# Patient Record
Sex: FEMALE | Race: WHITE | NOT HISPANIC OR LATINO | Employment: OTHER | URBAN - METROPOLITAN AREA
[De-identification: names, ages, dates, MRNs, and addresses within clinical notes are randomized per-mention and may not be internally consistent; named-entity substitution may affect disease eponyms.]

---

## 2018-11-28 ENCOUNTER — HOSPITAL ENCOUNTER (EMERGENCY)
Facility: MEDICAL CENTER | Age: 28
End: 2018-11-28
Attending: EMERGENCY MEDICINE
Payer: MEDICAID

## 2018-11-28 VITALS
DIASTOLIC BLOOD PRESSURE: 93 MMHG | SYSTOLIC BLOOD PRESSURE: 134 MMHG | HEIGHT: 65 IN | OXYGEN SATURATION: 95 % | HEART RATE: 80 BPM | WEIGHT: 162.7 LBS | TEMPERATURE: 98.6 F | BODY MASS INDEX: 27.11 KG/M2 | RESPIRATION RATE: 21 BRPM

## 2018-11-28 DIAGNOSIS — T78.40XA ALLERGIC REACTION, INITIAL ENCOUNTER: ICD-10-CM

## 2018-11-28 PROCEDURE — 99284 EMERGENCY DEPT VISIT MOD MDM: CPT

## 2018-11-28 PROCEDURE — 700101 HCHG RX REV CODE 250: Performed by: EMERGENCY MEDICINE

## 2018-11-28 PROCEDURE — 700111 HCHG RX REV CODE 636 W/ 250 OVERRIDE (IP): Performed by: EMERGENCY MEDICINE

## 2018-11-28 PROCEDURE — 96374 THER/PROPH/DIAG INJ IV PUSH: CPT

## 2018-11-28 PROCEDURE — 96375 TX/PRO/DX INJ NEW DRUG ADDON: CPT

## 2018-11-28 PROCEDURE — 94640 AIRWAY INHALATION TREATMENT: CPT

## 2018-11-28 RX ORDER — EPINEPHRINE 0.3 MG/.3ML
0.3 INJECTION SUBCUTANEOUS ONCE
Qty: 0.3 ML | Refills: 0 | Status: SHIPPED | OUTPATIENT
Start: 2018-11-28 | End: 2018-11-28

## 2018-11-28 RX ORDER — METHYLPREDNISOLONE SODIUM SUCCINATE 125 MG/2ML
125 INJECTION, POWDER, LYOPHILIZED, FOR SOLUTION INTRAMUSCULAR; INTRAVENOUS ONCE
Status: COMPLETED | OUTPATIENT
Start: 2018-11-28 | End: 2018-11-28

## 2018-11-28 RX ORDER — PREDNISONE 20 MG/1
60 TABLET ORAL DAILY
Qty: 9 TAB | Refills: 0 | Status: SHIPPED | OUTPATIENT
Start: 2018-11-28 | End: 2018-12-01

## 2018-11-28 RX ADMIN — METHYLPREDNISOLONE SODIUM SUCCINATE 125 MG: 125 INJECTION, POWDER, FOR SOLUTION INTRAMUSCULAR; INTRAVENOUS at 16:14

## 2018-11-28 RX ADMIN — FAMOTIDINE 20 MG: 10 INJECTION INTRAVENOUS at 16:14

## 2018-11-28 RX ADMIN — ALBUTEROL SULFATE 2.5 MG: 2.5 SOLUTION RESPIRATORY (INHALATION) at 16:18

## 2018-11-28 ASSESSMENT — LIFESTYLE VARIABLES: DO YOU DRINK ALCOHOL: NO

## 2018-11-28 NOTE — ED TRIAGE NOTES
Pt ambs to triage  Chief Complaint   Patient presents with   • Allergic Reaction     Itchy Full body, ate a salad 10 min ago. Unaware of food allergies.  took 2 benadryl (unsure what dose)   • Nasal Congestion   • Facial Swelling   • Wheezing     Pt is an asthmatic   • Tongue Swelling     pt feels her tongue is getting fat, handling secretions, speaking in full sentences   Charge RN aware of pt

## 2018-11-29 NOTE — FLOWSHEET NOTE
11/28/18 1621   Events/Summary/Plan   Events/Summary/Plan SVN given, patient stable   Interdisciplinary Plan of Care-Goals (Indications)   Bronchodilator Indications Physical Exam / Hyperinflation / Wheezing (bronchospasm)   Interdisciplinary Plan of Care-Outcomes    Bronchodilator Outcome Improved Vital Signs and Measures of Gas Exchange   SVN Group   #SVN Performed Yes   Given By: Mouthpiece   Date SVN Last Changed 11/28/18   Date SVN Next Change Due (Q 7 Days) 12/05/18   Respiratory WDL   Respiratory (WDL) X   Chest Exam   Work Of Breathing / Effort Mild   Respiration 19   Pulse 76   Heart Rate (Monitored) 76   Breath Sounds   Pre/Post Intervention Pre Intervention Assessment   RUL Breath Sounds Clear   RML Breath Sounds Clear   RLL Breath Sounds Clear   DAVID Breath Sounds Clear   LLL Breath Sounds Clear   Oximetry   Continuous Oximetry Yes   Oxygen   Home O2 Use Prior To Admission? No   Pulse Oximetry 95 %   O2 (LPM) 0   FiO2% 21 %   O2 Daily Delivery Respiratory  Room Air with O2 Available

## 2018-11-29 NOTE — ED NOTES
Pt given discharge instructions/prescriptions given/ home care instructions explained, pt verbalized understanding of instructions given, pt ambulatory to [facility].

## 2018-11-29 NOTE — ED PROVIDER NOTES
ED Provider Note    CHIEF COMPLAINT  Chief Complaint   Patient presents with   • Allergic Reaction     Itchy Full body, ate a salad 10 min ago. Unaware of food allergies.  took 2 benadryl (unsure what dose)   • Nasal Congestion   • Facial Swelling   • Wheezing     Pt is an asthmatic   • Tongue Swelling     pt feels her tongue is getting fat, handling secretions, speaking in full sentences       HPI  Ml MILLS is a 28 y.o. female who presents for evaluation of allergic reaction, a few minutes prior to arrival she was eating a salad and had an acute onset of generalized itching, tongue and lip swelling as well as puffiness around her eyes he did feel short of breath, she took some Benadryl, 50 mg total, and came right to the emergency department.  She states one time as a child she had an allergic reaction that she presumed was to strawberries, she had no other food allergies that she knows of, no allergic reaction since then.  Since taking the Benadryl she reports slight improvement.  No nausea, vomiting or diarrhea, no abdominal pain.  No other specific complaints offered by the patient at this time    REVIEW OF SYSTEMS  Negative for fever, abdominal pain, nausea, vomiting, diarrhea, headache, focal weakness, focal numbness, focal tingling, back pain. All other systems are negative.     PAST MEDICAL HISTORY  Past Medical History:   Diagnosis Date   • Insomnia        FAMILY HISTORY  No family history on file.    SOCIAL HISTORY  Social History   Substance Use Topics   • Smoking status: Current Every Day Smoker     Packs/day: 0.50     Types: Cigarettes   • Smokeless tobacco: Not on file   • Alcohol use Yes      Comment: occasional       SURGICAL HISTORY  Past Surgical History:   Procedure Laterality Date   • GYN SURGERY       x 2   • OTHER      deviated septum repair       CURRENT MEDICATIONS  I personally reviewed the medication list in the charting documentation.     ALLERGIES  No Known  "Allergies    MEDICAL RECORD  I have reviewed patient's medical record and pertinent results are listed above.      PHYSICAL EXAM  VITAL SIGNS: /93   Pulse 100   Temp 37 °C (98.6 °F) (Temporal)   Resp 18   Ht 1.651 m (5' 5\")   Wt 73.8 kg (162 lb 11.2 oz)   SpO2 95%   BMI 27.07 kg/m²    Constitutional: Well appearing patient in no acute distress.  Not toxic, nor ill in appearance.  Smiling and laughing  HENT: Mucus membranes edema noted of the lips diffusely with mild edema of the tongue.  Eyes: No scleral icterus.  Injected conjunctiva.  Bilaterally edematous periorbital regions.  Neck: Supple, comfortable, nonpainful range of motion.  No stridor.  Cardiovascular: Regular heart rate and rhythm.   Thorax & Lungs: Scattered expiratory wheezes, no rales, good air movement bilaterally.  Abdomen: Soft, with no tenderness, rebound nor guarding.  No mass or pulsatile mass appreciated.  Skin: Warm, dry. No rash appreciated  Extremities/Musculoskeletal: No sign of trauma. No asymmetric calf tenderness, erythema or edema. Normal range of motion   Neurologic: Alert & oriented. No focal deficits observed.   Psychiatric: Normal affect appropriate for the clinical situation.    COURSE & MEDICAL DECISION MAKING  I have reviewed any medical record information, laboratory studies and radiographic results as noted above    Encounter Summary: This is a 28 y.o. female with presentation consistent with allergic reaction to an unknown food product from a salad, took Benadryl and is improving slowly prior to arrival, she has evidence of tongue and lip swelling as well as some wheezing and puffiness of her eyes and nasal congestion, she Zheng took Benadryl so will administer Solu-Medrol and Pepcid with IV fluids, I will not administer epinephrine at this time but she will be watched quite closely here in the emergency department ------ patient observed for over 2 hours, she reports essentially resolution of all of her symptoms " except for the puffiness around her eyes, she looks well, will be prescribed a 3-day course of prednisone to begin tomorrow and an EpiPen, I have instructed her to use Benadryl around every 6 hours for the next day or 2, strict return instructions have been discussed      DISPOSITION: Discharge home in stable condition      FINAL IMPRESSION  1. Allergic reaction, initial encounter           This dictation was created using voice recognition software. The accuracy of the dictation is limited to the abilities of the software. I expect there may be some errors of grammar and possibly content. The nursing notes were reviewed and certain aspects of this information were incorporated into this note.    Electronically signed by: Tobias Carpenter, 11/28/2018 4:07 PM

## 2020-01-03 ENCOUNTER — HOSPITAL ENCOUNTER (EMERGENCY)
Facility: MEDICAL CENTER | Age: 30
End: 2020-01-03
Attending: EMERGENCY MEDICINE
Payer: MEDICAID

## 2020-01-03 VITALS
OXYGEN SATURATION: 96 % | WEIGHT: 147.71 LBS | HEIGHT: 65 IN | TEMPERATURE: 99.7 F | HEART RATE: 79 BPM | SYSTOLIC BLOOD PRESSURE: 106 MMHG | RESPIRATION RATE: 19 BRPM | BODY MASS INDEX: 24.61 KG/M2 | DIASTOLIC BLOOD PRESSURE: 55 MMHG

## 2020-01-03 DIAGNOSIS — T78.40XA ALLERGIC REACTION, INITIAL ENCOUNTER: ICD-10-CM

## 2020-01-03 PROCEDURE — 96374 THER/PROPH/DIAG INJ IV PUSH: CPT

## 2020-01-03 PROCEDURE — 700111 HCHG RX REV CODE 636 W/ 250 OVERRIDE (IP): Performed by: EMERGENCY MEDICINE

## 2020-01-03 PROCEDURE — 99284 EMERGENCY DEPT VISIT MOD MDM: CPT

## 2020-01-03 PROCEDURE — 96372 THER/PROPH/DIAG INJ SC/IM: CPT

## 2020-01-03 PROCEDURE — 96375 TX/PRO/DX INJ NEW DRUG ADDON: CPT

## 2020-01-03 RX ORDER — METHYLPREDNISOLONE SODIUM SUCCINATE 125 MG/2ML
125 INJECTION, POWDER, LYOPHILIZED, FOR SOLUTION INTRAMUSCULAR; INTRAVENOUS ONCE
Status: COMPLETED | OUTPATIENT
Start: 2020-01-03 | End: 2020-01-03

## 2020-01-03 RX ORDER — PREDNISONE 20 MG/1
60 TABLET ORAL DAILY
Qty: 12 TAB | Refills: 0 | Status: SHIPPED | OUTPATIENT
Start: 2020-01-03 | End: 2020-01-07

## 2020-01-03 RX ORDER — EPINEPHRINE 1 MG/ML(1)
0.5 AMPUL (ML) INJECTION ONCE
Status: COMPLETED | OUTPATIENT
Start: 2020-01-03 | End: 2020-01-03

## 2020-01-03 RX ORDER — EPINEPHRINE 0.3 MG/.3ML
0.3 INJECTION SUBCUTANEOUS ONCE
Qty: 0.3 ML | Refills: 0 | Status: SHIPPED | OUTPATIENT
Start: 2020-01-03 | End: 2020-01-03

## 2020-01-03 RX ORDER — DIPHENHYDRAMINE HYDROCHLORIDE 50 MG/ML
25 INJECTION INTRAMUSCULAR; INTRAVENOUS ONCE
Status: COMPLETED | OUTPATIENT
Start: 2020-01-03 | End: 2020-01-03

## 2020-01-03 RX ADMIN — METHYLPREDNISOLONE SODIUM SUCCINATE 125 MG: 125 INJECTION, POWDER, FOR SOLUTION INTRAMUSCULAR; INTRAVENOUS at 17:35

## 2020-01-03 RX ADMIN — EPINEPHRINE 0.5 MG: 1 INJECTION INTRAMUSCULAR; INTRAVENOUS; SUBCUTANEOUS at 17:35

## 2020-01-03 RX ADMIN — DIPHENHYDRAMINE HYDROCHLORIDE 25 MG: 50 INJECTION INTRAMUSCULAR; INTRAVENOUS at 17:35

## 2020-01-03 RX ADMIN — FAMOTIDINE 20 MG: 10 INJECTION INTRAVENOUS at 17:36

## 2020-01-03 ASSESSMENT — LIFESTYLE VARIABLES: DO YOU DRINK ALCOHOL: NO

## 2020-01-04 NOTE — ED PROVIDER NOTES
ED Provider Note      Primary care provider: Pcp Pt States None  Means of arrival: Walk-in  History obtained from: Patient      CHIEF COMPLAINT  Chief Complaint   Patient presents with   • Allergic Reaction       HPI  Ml MILLS is a 29 y.o. female who presents to the Emergency Department complaining of an acute allergic reaction.  She states that all symptoms started about an hour ago with itching on her palms which spread to generalized body itching.  She also describes some shortness of breath, tongue swelling, nasal congestion and wheezing.  She is uncertain what may have caused this reaction.  She took 2 half tablets of Benadryl before coming to the ED.  Reports that a similar episode happened about a year ago and both now and then her only possible exposure was a salad.  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  She is not on any new medications.  No exposure to new detergents or soaps.    REVIEW OF SYSTEMS  Pertinent positives include heart racing, shortness of breath, wheezing, itching. Pertinent negatives include no chest pain, nausea, vomiting.  All other systems reviewed and negative.    PAST MEDICAL HISTORY   has a past medical history of Insomnia.    SURGICAL HISTORY   has a past surgical history that includes gyn surgery and other.    SOCIAL HISTORY  Social History     Tobacco Use   • Smoking status: Former Smoker     Packs/day: 0.00     Types: Cigarettes   Substance Use Topics   • Alcohol use: Yes     Comment: occasional   • Drug use: No      Social History     Substance and Sexual Activity   Drug Use No       FAMILY HISTORY  History reviewed. No pertinent family history.    CURRENT MEDICATIONS  Home Medications     Reviewed by Yamile Reyes R.N. (Registered Nurse) on 01/03/20 at 1712  Med List Status: Complete   Medication Last Dose Status        Patient Nino Taking any Medications                       ALLERGIES  No Known  "Allergies    PHYSICAL EXAM  VITAL SIGNS: /83   Pulse (!) 122   Temp 37.6 °C (99.7 °F) (Temporal)   Resp 18   Ht 1.651 m (5' 5\")   Wt 67 kg (147 lb 11.3 oz)   SpO2 92%   BMI 24.58 kg/m²     Constitutional: Alert in no apparent distress.  HENT: No signs of trauma, Bilateral external ears normal, Nose normal.  No obvious swelling or edema in the palate or tongue  Eyes: Pupils are equal and reactive, Conjunctiva injected, Non-icteric.   Neck: Normal range of motion, No tenderness, Supple, Mild stridor.   Cardiovascular: Tachycardic, regular rhythm, no murmurs.   Thorax & Lungs: Normal breath sounds, No respiratory distress, No wheezing, No chest tenderness.   Abdomen: Bowel sounds normal, Soft, No tenderness, No masses, No peritoneal signs.  Skin: Warm, Dry, disputes flushing, No discrete rash.   Musculoskeletal:  No major deformities noted.   Neurologic: Alert, moving all extremities without difficulty, no focal deficits.          COURSE & MEDICAL DECISION MAKING  Pertinent Labs & Imaging studies reviewed. (See chart for details)    Differential diagnoses include but are not limited to: Anaphylaxis, acute allergic reaction, asthma exacerbation    5:38 PM - Patient seen and examined at bedside. Patient will be treated with IM epinephrine, IV Benadryl, IV famotidine and IV Solu-Medrol.     6:00 PM - Reevaluated patient.  She reports improvement in breathing, decreased itching, general improvement in symptoms.    8:07 PM -Patient reevaluated again.  Skin erythema has resolved.  No stridor appreciated on exam.  Patient reports resolution of symptoms.  As allergic reaction has responded well to medications, has        Decision Making:  This is a 29 y.o. year old female who presents with an acute allergic reaction with stridor and upper airway involvement on exam.  There is no wheezing on lung exam.  Due to concern for imminent anaphylaxis, will treat with epinephrine, steroids, Benadryl and famotidine.  Patient " responded well to intervention.  Her symptoms have resolved.  Therefore she will be discharged in a stable condition with outpatient medications and instruction to follow-up with an allergist for allergy testing.    The patient will not drink alcohol nor drive with prescribed medications. The patient will return for new or worsening symptoms and is stable at the time of discharge. Patient was given return precautions. Patient and/or family member verbalizes understanding and will comply.    DISPOSITION:  Patient will be discharged home in stable condition.    FOLLOW UP:  [facility], Emergency Dept  [facility] [facility] [address] [phone]    Return for worsening or recurrent allergic reaction, difficulty breathing or other concerns      OUTPATIENT MEDICATIONS:  New Prescriptions    EPINEPHRINE (EPIPEN 2-DUDLEY) 0.3 MG/0.3ML SOLUTION AUTO-INJECTOR SOLUTION FOR INJECTION    0.3 mL by Intramuscular route Once for 1 dose.    PREDNISONE (DELTASONE) 20 MG TAB    Take 3 Tabs by mouth every day for 4 days.         FINAL IMPRESSION  1. Allergic reaction, initial encounter               This dictation has been created using voice recognition software and/or scribes. The accuracy of the dictation is limited by the abilities of the software and the expertise of the scribes. I expect there may be some errors of grammar and possibly content. I made every attempt to manually correct the errors within my dictation. However, errors related to voice recognition software and/or scribes may still exist and should be interpreted within the appropriate context.

## 2020-01-04 NOTE — ED TRIAGE NOTES
Pt comes in complaining of an allergic RXN to something unknown starting approx 45 minutes ago. Pt did take two oral benadryl PTA. Wheezing noted.

## 2020-01-04 NOTE — ED NOTES
Patient discharged home to self care with her family. All questions answered and concerns alleviated. Patient instructed on epi pen use and return pre cautions.

## 2020-06-09 ENCOUNTER — HOSPITAL ENCOUNTER (EMERGENCY)
Facility: MEDICAL CENTER | Age: 30
End: 2020-06-09
Attending: EMERGENCY MEDICINE
Payer: MEDICAID

## 2020-06-09 VITALS
RESPIRATION RATE: 16 BRPM | WEIGHT: 160.72 LBS | HEART RATE: 66 BPM | SYSTOLIC BLOOD PRESSURE: 109 MMHG | DIASTOLIC BLOOD PRESSURE: 58 MMHG | BODY MASS INDEX: 26.74 KG/M2 | TEMPERATURE: 98 F | OXYGEN SATURATION: 100 %

## 2020-06-09 DIAGNOSIS — T78.40XA ALLERGIC REACTION, INITIAL ENCOUNTER: ICD-10-CM

## 2020-06-09 PROCEDURE — 700102 HCHG RX REV CODE 250 W/ 637 OVERRIDE(OP): Mod: EDC | Performed by: EMERGENCY MEDICINE

## 2020-06-09 PROCEDURE — 700111 HCHG RX REV CODE 636 W/ 250 OVERRIDE (IP): Mod: EDC | Performed by: EMERGENCY MEDICINE

## 2020-06-09 PROCEDURE — A9270 NON-COVERED ITEM OR SERVICE: HCPCS | Mod: EDC | Performed by: EMERGENCY MEDICINE

## 2020-06-09 PROCEDURE — 99283 EMERGENCY DEPT VISIT LOW MDM: CPT | Mod: EDC

## 2020-06-09 RX ORDER — PREDNISONE 20 MG/1
60 TABLET ORAL ONCE
Status: COMPLETED | OUTPATIENT
Start: 2020-06-09 | End: 2020-06-09

## 2020-06-09 RX ORDER — DIPHENHYDRAMINE HCL 25 MG
50 TABLET ORAL ONCE
Status: COMPLETED | OUTPATIENT
Start: 2020-06-09 | End: 2020-06-09

## 2020-06-09 RX ORDER — FAMOTIDINE 20 MG/1
20 TABLET, FILM COATED ORAL ONCE
Status: COMPLETED | OUTPATIENT
Start: 2020-06-09 | End: 2020-06-09

## 2020-06-09 RX ORDER — PREDNISONE 20 MG/1
60 TABLET ORAL DAILY
Qty: 12 TAB | Refills: 0 | Status: SHIPPED | OUTPATIENT
Start: 2020-06-09 | End: 2020-06-13

## 2020-06-09 RX ADMIN — DIPHENHYDRAMINE HYDROCHLORIDE 50 MG: 25 TABLET ORAL at 09:58

## 2020-06-09 RX ADMIN — PREDNISONE 60 MG: 20 TABLET ORAL at 09:58

## 2020-06-09 RX ADMIN — FAMOTIDINE 20 MG: 20 TABLET, FILM COATED ORAL at 09:58

## 2020-06-09 NOTE — ED NOTES
First interaction with patient.  Assumed care of patient at this time.  Patient presents to ER today for chief complaint of allergic reaction.  Patient states that this has happened 3 times now in the last year and a half.  Patient states that the first two times happened after eating a salad, and today's reaction started after eating musli cereal.  Patient states the common ingredients between all of her reactions are sunflower seeds and dried cranberries.  Patient complains of whole body itching and rhinorrhea. Lung sounds clear throughout.  No increased work of breathing or shortness of breath noted.  Respirations are even and unlabored.    Patient changed into gown and placed on monitor.  Patient's NPO status explained by this RN.  Call light provided.  Chart up for ERP.

## 2020-06-09 NOTE — DISCHARGE INSTRUCTIONS
Allergies, Adult  An allergy is when your body's defense system (immune system) overreacts to an otherwise harmless substance (allergen) that you breathe in or eat or something that touches your skin. When you come into contact with something that you are allergic to, your immune system produces certain proteins (antibodies). These proteins cause cells to release chemicals (histamines) that trigger the symptoms of an allergic reaction.  Allergies often affect the nasal passages (allergic rhinitis), eyes (allergic conjunctivitis), skin (atopic dermatitis), and stomach. Allergies can be mild or severe. Allergies cannot spread from person to person (are not contagious). They can develop at any age and may be outgrown.  What are the causes?  Allergies can be caused by any substance that your immune system mistakenly targets as harmful. These may include:  · Outdoor allergens, such as pollen, grass, weeds, car exhaust, and mold spores.  · Indoor allergens, such as dust, smoke, mold, and pet dander.  · Foods, especially peanuts, milk, eggs, fish, shellfish, soy, nuts, and wheat.  · Medicines, such as penicillin.  · Skin irritants, such as detergents, chemicals, and latex.  · Perfume.  · Insect bites or stings.  What increases the risk?  You may be at greater risk of allergies if other people in your family have allergies.  What are the signs or symptoms?  Symptoms depend on what type of allergy you have. They may include:  · Runny, stuffy nose.  · Sneezing.  · Itchy mouth, ears, or throat.  · Postnasal drip.  · Sore throat.  · Itchy, red, watery, or puffy eyes.  · Skin rash or hives.  · Stomach pain.  · Vomiting.  · Diarrhea.  · Bloating.  · Wheezing or coughing.  People with a severe allergy to food, medicine, or an insect bite may have a life-threatening allergic reaction (anaphylaxis). Symptoms of anaphylaxis include:  · Hives.  · Itching.  · Flushed face.  · Swollen lips, tongue, or mouth.  · Tight or swollen  throat.  · Chest pain or tightness in the chest.  · Trouble breathing or shortness of breath.  · Rapid heartbeat.  · Dizziness or fainting.  · Vomiting.  · Diarrhea.  · Pain in the abdomen.  How is this diagnosed?  This condition is diagnosed based on:  · Your symptoms.  · Your family and medical history.  · A physical exam.  You may need to see a health care provider who specializes in treating allergies (allergist). You may also have tests, including:  · Skin tests to see which allergens are causing your symptoms, such as:  ¨ Skin prick test. In this test, your skin is pricked with a tiny needle and exposed to small amounts of possible allergens to see if your skin reacts.  ¨ Intradermal skin test. In this test, a small amount of allergen is injected under your skin to see if your skin reacts.  ¨ Patch test. In this test, a small amount of allergen is placed on your skin and then your skin is covered with a bandage. Your health care provider will check your skin after a couple of days to see if a rash has developed.  · Blood tests.  · Challenges tests. In this test, you inhale a small amount of allergen by mouth to see if you have an allergic reaction.  You may also be asked to:  · Keep a food diary. A food diary is a record of all the foods and drinks you have in a day and any symptoms you experience.  · Practice an elimination diet. An elimination diet involves eliminating specific foods from your diet and then adding them back in one by one to find out if a certain food causes an allergic reaction.  How is this treated?  Treatment for allergies depends on your symptoms. Treatment may include:  · Cold compresses to soothe itching and swelling.  · Eye drops.  · Nasal sprays.  · Using a saline spray or container (neti pot) to flush out the nose (nasal irrigation). These methods can help clear away mucus and keep the nasal passages moist.  · Using a humidifier.  · Oral antihistamines or other medicines to block  allergic reaction and inflammation.  · Skin creams to treat rashes or itching.  · Diet changes to eliminate food allergy triggers.  · Repeated exposure to tiny amounts of allergens to build up a tolerance and prevent future allergic reactions (immunotherapy). These include:  ¨ Allergy shots.  ¨ Oral treatment. This involves taking small doses of an allergen under the tongue (sublingual immunotherapy).  · Emergency epinephrine injection (auto-injector) in case of an allergic emergency. This is a self-injectable, pre-measured medicine that must be given within the first few minutes of a serious allergic reaction.  Follow these instructions at home:  · Avoid known allergens whenever possible.  · If you suffer from airborne allergens, wash out your nose daily. You can do this with a saline spray or a neti pot to flush out your nose (nasal irrigation).  · Take over-the-counter and prescription medicines only as told by your health care provider.  · Keep all follow-up visits as told by your health care provider. This is important.  · If you are at risk of a severe allergic reaction (anaphylaxis), keep your auto-injector with you at all times.  · If you have ever had anaphylaxis, wear a medical alert bracelet or necklace that states you have a severe allergy.  Contact a health care provider if:  · Your symptoms do not improve with treatment.  Get help right away if:  · You have symptoms of anaphylaxis, such as:  ¨ Swollen mouth, tongue, or throat.  ¨ Pain or tightness in your chest.  ¨ Trouble breathing or shortness of breath.  ¨ Dizziness or fainting.  ¨ Severe abdominal pain, vomiting, or diarrhea.  This information is not intended to replace advice given to you by your health care provider. Make sure you discuss any questions you have with your health care provider.  Document Released: 03/12/2004 Document Revised: 08/17/2017 Document Reviewed: 07/05/2017  [employer] Interactive Patient Education © 2017 [employer]

## 2020-06-09 NOTE — ED NOTES
Ml MILLS has been discharged from the Emergency Room.    Discharge instructions, which include signs and symptoms to monitor patient for, as well as detailed information regarding allergic reaction provided.  All questions and concerns addressed at this time.  This RN also encouraged a follow- up appointment to be made with patient's PCP.     Prescription for prednisone provided to patient. Patient educated about the importance of completing the full 4 day course of medication, even if the patient's symptoms subside, verbalized understanding.    Patient leaves ER in no apparent distress. This RN provided education regarding returning to the ER for any new concerns or changes in patient's condition.      /58   Pulse 66   Temp 36.7 °C (98 °F) (Temporal)   Resp 16   Wt 72.9 kg (160 lb 11.5 oz)   LMP 06/02/2020   SpO2 100%   BMI 26.74 kg/m²

## 2020-06-09 NOTE — ED PROVIDER NOTES
"ED Provider Note    CHIEF COMPLAINT  Chief Complaint   Patient presents with   • Itching     in both hands/arms this am after taking \"Musli\" for breakfast cereals. denies taking new medication/no chemical exposure. NAD. skin pwd. no rash/hives noted. lungs clear.        HPI  Ml MILLS is a 30 y.o. female who presents with a history of previous allergic reactions after eating a salad with cranberry and nuts, that was in January, at that time the patient had face swelling and trouble breathing.  Today, the patient had Musli cereal for breakfast, and then started having runny nose concerning her that she may have anaphylaxis again.  However, today she describes no shortness of breath, no facial swelling, only runny nose and itchy hands.    REVIEW OF SYSTEMS  See HPI for further details. All other systems are negative.     PAST MEDICAL HISTORY   has a past medical history of Insomnia.    SOCIAL HISTORY  Social History     Tobacco Use   • Smoking status: Former Smoker     Packs/day: 0.00     Types: Cigarettes   Substance and Sexual Activity   • Alcohol use: Yes     Comment: occasional   • Drug use: No   • Sexual activity: Not on file       SURGICAL HISTORY   has a past surgical history that includes gyn surgery and other.    CURRENT MEDICATIONS  Home Medications     Reviewed by Becca Naylor R.N. (Registered Nurse) on 06/09/20 at 0900  Med List Status: Complete   Medication Last Dose Status        Patient Nino Taking any Medications                       ALLERGIES  No Known Allergies    PHYSICAL EXAM  VITAL SIGNS: /58   Pulse 73   Temp 36.3 °C (97.3 °F) (Temporal)   Resp 16   Wt 72.9 kg (160 lb 11.5 oz)   LMP 06/02/2020   SpO2 100%   BMI 26.74 kg/m²  @LANDON[[id number]::@   Pulse ox interpretation: I interpret this pulse ox as normal.  Constitutional: Alert in no apparent distress.  HENT: No signs of trauma, Bilateral external ears normal, Nose normal.  The patient's nose is slightly erythematous and she has " evidence of rhinorrhea.  Eyes: Pupils are equal and reactive, Conjunctiva normal, Non-icteric.   Neck: Normal range of motion, No tenderness, Supple, No stridor.   Lymphatic: No lymphadenopathy noted.   Cardiovascular: Regular rate and rhythm, no murmurs.   Thorax & Lungs: Normal breath sounds, No respiratory distress, No wheezing, No chest tenderness.   Abdomen: Bowel sounds normal, Soft, No tenderness, No masses, No pulsatile masses. No peritoneal signs.  Skin: Warm, Dry, No erythema, No rash.   Back: No bony tenderness, No CVA tenderness.   Extremities: Intact distal pulses, No edema, No tenderness, No cyanosis.  Musculoskeletal: Good range of motion in all major joints. No tenderness to palpation or major deformities noted.   Neurologic: Alert , Normal motor function, Normal sensory function, No focal deficits noted.   Psychiatric: Affect normal, Judgment normal, Mood normal.             COURSE & MEDICAL DECISION MAKING  Pertinent Labs & Imaging studies reviewed. (See chart for details)    The patient's allergic reaction today is very mild.  She has no evidence of airway symptoms.  The patient in the ER is given 50 mg p.o. Benadryl, 60 mg p.o. prednisone, and 20 mg p.o. Pepcid.  She is given a prescription for 4 more days of prednisone, she already has an EpiPen at home.  I have encouraged the patient to establish a primary care doctor and see an allergist.  She will return for any face swelling, trouble breathing, worsening symptoms.    The patient will return for new or worsening symptoms and is stable at the time of discharge.    The patient is referred to a primary physician for blood pressure management, diabetic screening, and for all other preventative health concerns.        DISPOSITION:  Patient will be discharged home in stable condition.    FOLLOW UP:  [facility], Emergency Dept  [address] [address] [phone]    If symptoms worsen    [facility]  [address] [address]  [phone]    call for follow up to establish a primary care doctor if you do not already have one      OUTPATIENT MEDICATIONS:  New Prescriptions    PREDNISONE (DELTASONE) 20 MG TAB    Take 3 Tabs by mouth every day for 4 days.        The patient will return for worsening symptoms and is stable at the time of discharge. The patient verbalizes understanding and will comply.    FINAL IMPRESSION  1. Allergic reaction, initial encounter                Electronically signed by: Israel Marlow M.D., 6/9/2020 9:46 AM

## 2020-06-09 NOTE — ED TRIAGE NOTES
"Chief Complaint   Patient presents with   • Itching     in both hands/arms this am after taking \"Musli\" for breakfast cereals. denies taking new medication/no chemical exposure. NAD. skin pwd. no rash/hives noted. lungs clear.      Respiration unlabored. Educated on triage process. Instructed to notify staff for any worsening symptoms. Denies any recent travel. Denies exposure to known covid positive patients. Denies any respiratory symptoms.  "

## 2020-10-27 ENCOUNTER — TELEPHONE (OUTPATIENT)
Dept: SCHEDULING | Facility: IMAGING CENTER | Age: 30
End: 2020-10-27

## 2020-11-17 ENCOUNTER — OFFICE VISIT (OUTPATIENT)
Dept: MEDICAL GROUP | Facility: MEDICAL CENTER | Age: 30
End: 2020-11-17
Attending: PHYSICIAN ASSISTANT
Payer: MEDICAID

## 2020-11-17 VITALS
RESPIRATION RATE: 16 BRPM | TEMPERATURE: 97.5 F | SYSTOLIC BLOOD PRESSURE: 100 MMHG | BODY MASS INDEX: 27.39 KG/M2 | OXYGEN SATURATION: 93 % | HEART RATE: 75 BPM | WEIGHT: 164.4 LBS | HEIGHT: 65 IN | DIASTOLIC BLOOD PRESSURE: 60 MMHG

## 2020-11-17 DIAGNOSIS — F41.9 ANXIETY AND DEPRESSION: ICD-10-CM

## 2020-11-17 DIAGNOSIS — F32.A ANXIETY AND DEPRESSION: ICD-10-CM

## 2020-11-17 DIAGNOSIS — Z23 NEED FOR VACCINATION: ICD-10-CM

## 2020-11-17 PROCEDURE — 99204 OFFICE O/P NEW MOD 45 MIN: CPT | Performed by: PHYSICIAN ASSISTANT

## 2020-11-17 PROCEDURE — 99203 OFFICE O/P NEW LOW 30 MIN: CPT | Mod: 25 | Performed by: PHYSICIAN ASSISTANT

## 2020-11-17 PROCEDURE — 99213 OFFICE O/P EST LOW 20 MIN: CPT | Mod: 25 | Performed by: PHYSICIAN ASSISTANT

## 2020-11-17 PROCEDURE — 90686 IIV4 VACC NO PRSV 0.5 ML IM: CPT

## 2020-11-17 PROCEDURE — 90686 IIV4 VACC NO PRSV 0.5 ML IM: CPT | Performed by: PHYSICIAN ASSISTANT

## 2020-11-17 ASSESSMENT — ENCOUNTER SYMPTOMS
CHILLS: 0
CONSTIPATION: 0
DOUBLE VISION: 0
DIZZINESS: 0
BLOOD IN STOOL: 0
HEADACHES: 0
COUGH: 0
PHOTOPHOBIA: 0
CLAUDICATION: 0
NAUSEA: 0
PALPITATIONS: 0
DEPRESSION: 1
WHEEZING: 0
SHORTNESS OF BREATH: 0
WEIGHT LOSS: 0
VOMITING: 0
FEVER: 0
NERVOUS/ANXIOUS: 1
SORE THROAT: 0
TINGLING: 0
WEAKNESS: 0
BLURRED VISION: 0
SINUS PAIN: 0
DIARRHEA: 0

## 2020-11-17 ASSESSMENT — LIFESTYLE VARIABLES: SUBSTANCE_ABUSE: 0

## 2020-11-17 ASSESSMENT — PATIENT HEALTH QUESTIONNAIRE - PHQ9
3. TROUBLE FALLING OR STAYING ASLEEP OR SLEEPING TOO MUCH: MORE THAN HALF THE DAYS
2. FEELING DOWN, DEPRESSED, IRRITABLE, OR HOPELESS: MORE THAN HALF THE DAYS
7. TROUBLE CONCENTRATING ON THINGS, SUCH AS READING THE NEWSPAPER OR WATCHING TELEVISION: SEVERAL DAYS
1. LITTLE INTEREST OR PLEASURE IN DOING THINGS: NEARLY EVERY DAY
6. FEELING BAD ABOUT YOURSELF - OR THAT YOU ARE A FAILURE OR HAVE LET YOURSELF OR YOUR FAMILY DOWN: SEVERAL DAYS
SUM OF ALL RESPONSES TO PHQ QUESTIONS 1-9: 13
8. MOVING OR SPEAKING SO SLOWLY THAT OTHER PEOPLE COULD HAVE NOTICED. OR THE OPPOSITE, BEING SO FIGETY OR RESTLESS THAT YOU HAVE BEEN MOVING AROUND A LOT MORE THAN USUAL: NOT AT ALL
5. POOR APPETITE OR OVEREATING: MORE THAN HALF THE DAYS
9. THOUGHTS THAT YOU WOULD BE BETTER OFF DEAD, OR OF HURTING YOURSELF: NOT AT ALL
SUM OF ALL RESPONSES TO PHQ9 QUESTIONS 1 AND 2: 5
4. FEELING TIRED OR HAVING LITTLE ENERGY: MORE THAN HALF THE DAYS

## 2020-11-17 NOTE — ASSESSMENT & PLAN NOTE
Depression  29 yo female who presents today for evaluation of depressed mood and/or loss of interests/pleasure most days, usually >2 days/week.   There is no imminent risk of serious self-harm/suicide. PHQ9 obtained in clinic today revealed a score of 13 indicating moderate depression. She is not on any current medications, does not have any comorbid medical conditions or substance abuse causing depression. Denies hx of treatment resistant depression, current jhonny, hypomania or psychosis. No personal or FMHx of bipolar disorder.     Anxiety  29 yo female who presents today for evaluation of excessive anxiety and worry that has occurred more often than not for >/= 6 months. Patient finds it difficult to control the worry and is easily fatigued, difficulty concentrating or mind going blank, restlessness or feeling keyed up or on edge, irritability and sleep disturbances. This has affected her social/occupational areas of functioning. NICOL 7 performed in clinic today with a score of 10 indicating moderate anxiety.  No imminent risk of serious self-harm/suicide. No current substance abuse. No hx of known thyroid disorder. No hx of psychiatric conditions.    Tisha reports that she has done combination therapy in the past which was very beneficial for her. She reports that she ended up discontinuing therapy and medications as she felt that she was able to manage the anxiety and depression on her own. She reports that she was successful for a long time. However, recently she has felt that it has become more difficult for her to manage this on her own. She works with the public as a  which has caused her great anxiety due to the current pandemic. She has also resorted to binge eating which has been her biggest struggle in the depression aspect of things. She feels she has been unable to manage her symptoms and is seeking help today.     Previous medications tried: Lexapro, Topamax (which managed her  depression and binge eating), Zoloft and Xanax.   She reports that her and her partner are planning on conceiving which she is unsure will play a role in what medications she can take.

## 2020-11-17 NOTE — PROGRESS NOTES
Chief Complaint   Patient presents with   • Depression   • Establish Care       Subjective:     HPI:   Ml Goldman is a 30 y.o. female here to establish care and to discuss the evaluation and management of:    Anxiety and depression    Depression  29 yo female who presents today for evaluation of depressed mood and/or loss of interests/pleasure most days, usually >2 days/week.   There is no imminent risk of serious self-harm/suicide. PHQ9 obtained in clinic today revealed a score of 13 indicating moderate depression. She is not on any current medications, does not have any comorbid medical conditions or substance abuse causing depression. Denies hx of treatment resistant depression, current jhonny, hypomania or psychosis. No personal or FMHx of bipolar disorder.     Anxiety  29 yo female who presents today for evaluation of excessive anxiety and worry that has occurred more often than not for >/= 6 months. Patient finds it difficult to control the worry and is easily fatigued, difficulty concentrating or mind going blank, restlessness or feeling keyed up or on edge, irritability and sleep disturbances. This has affected her social/occupational areas of functioning. NICOL 7 performed in clinic today with a score of 10 indicating moderate anxiety.  No imminent risk of serious self-harm/suicide. No current substance abuse. No hx of known thyroid disorder. No hx of psychiatric conditions.    Tisha reports that she has done combination therapy in the past which was very beneficial for her. She reports that she ended up discontinuing therapy and medications as she felt that she was able to manage the anxiety and depression on her own. She reports that she was successful for a long time. However, recently she has felt that it has become more difficult for her to manage this on her own. She works with the public as a  which has caused her great anxiety due to the current pandemic. She has also resorted to  binge eating which has been her biggest struggle in the depression aspect of things. She feels she has been unable to manage her symptoms and is seeking help today.     Previous medications tried: Lexapro, Topamax (which managed her depression and binge eating), Zoloft and Xanax.   She reports that her and her partner are planning on conceiving which she is unsure will play a role in what medications she can take.       ROS  Review of Systems   Constitutional: Negative for chills, fever, malaise/fatigue and weight loss.   HENT: Negative for congestion, sinus pain and sore throat.    Eyes: Negative for blurred vision, double vision and photophobia.   Respiratory: Negative for cough, shortness of breath and wheezing.    Cardiovascular: Negative for chest pain, palpitations, claudication and leg swelling.   Gastrointestinal: Negative for blood in stool, constipation, diarrhea, melena, nausea and vomiting.   Genitourinary: Negative for dysuria, frequency and urgency.   Skin: Negative for itching and rash.   Neurological: Negative for dizziness, tingling, weakness and headaches.   Psychiatric/Behavioral: Positive for depression. Negative for substance abuse and suicidal ideas. The patient is nervous/anxious.        No Known Allergies    Current medicines (including changes today)  No current outpatient medications on file.     No current facility-administered medications for this visit.      She  has a past medical history of Depression, Familial hyperlipidemia, and Insomnia.  She  has a past surgical history that includes gyn surgery and other.  Social History     Tobacco Use   • Smoking status: Former Smoker     Packs/day: 0.00     Types: Cigarettes   • Smokeless tobacco: Never Used   Substance Use Topics   • Alcohol use: Yes     Comment: occasional   • Drug use: No       Family History   Problem Relation Age of Onset   • Hyperlipidemia Father    • Cancer Neg Hx    • Lung Disease Neg Hx    • Heart Disease Neg Hx    •  "Hypertension Neg Hx    • Diabetes Neg Hx      No family status information on file.       Patient Active Problem List    Diagnosis Date Noted   • Anxiety and depression 11/17/2020        Objective:     /60 (BP Location: Left arm, Patient Position: Sitting, BP Cuff Size: Adult)   Pulse 75   Temp 36.4 °C (97.5 °F) (Temporal)   Resp 16   Ht 1.651 m (5' 5\")   Wt 74.6 kg (164 lb 6.4 oz)   SpO2 93%  Body mass index is 27.36 kg/m².    Physical Exam:  Physical Exam   Constitutional: She is oriented to person, place, and time and well-developed, well-nourished, and in no distress.   HENT:   Head: Normocephalic.   Right Ear: External ear normal.   Left Ear: External ear normal.   Neck: Normal range of motion.   Cardiovascular: Normal rate, regular rhythm and normal heart sounds.   Pulmonary/Chest: Effort normal and breath sounds normal.   Musculoskeletal: Normal range of motion.   Neurological: She is alert and oriented to person, place, and time. Gait normal.   Skin: Skin is warm and dry.   Psychiatric: Affect and judgment normal.   Vitals reviewed.       Assessment and Plan:     The following treatment plan was discussed:    1. Anxiety and depression  - This is a chronic condition.   - No imminent risk of suicidal or homicidal ideation.   - PHQ-9 performed in clinic today revealed a score of 13 indicating moderate depression and a NICOL-7 score of 10 indicating moderate anxiety.  - The patient would benefit greatly from combination therapy with a medication and CBT. [facility] card given to the patient in clinic today. I have encouraged her call and get scheduled today.    - Plan: Refer to Psychiatry for medication management due to patient and her partner wishing to conceive and psychology for CBT.   - Follow up prn.     2. Need for vaccination  - Influenza Vaccine Quad Injection (PF)       Any change or worsening of signs or symptoms, patient encouraged to follow-up or report to emergency room for further " evaluation. Patient verbalizes understanding and agrees.    Follow-Up: Return if symptoms worsen or fail to improve.      PLEASE NOTE: This dictation was created using voice recognition software. I have made every reasonable attempt to correct obvious errors, but I expect that there are errors of grammar and possibly content that I did not discover before finalizing the note.

## 2021-08-20 ENCOUNTER — HOSPITAL ENCOUNTER (OUTPATIENT)
Dept: HOSPITAL 8 - LDOP | Age: 31
Discharge: HOME | End: 2021-08-20
Attending: OBSTETRICS & GYNECOLOGY
Payer: MEDICAID

## 2021-08-20 DIAGNOSIS — Z3A.00: ICD-10-CM

## 2021-08-20 PROCEDURE — 59025 FETAL NON-STRESS TEST: CPT

## 2021-08-21 ENCOUNTER — HOSPITAL ENCOUNTER (INPATIENT)
Dept: HOSPITAL 8 - LDOP | Age: 31
LOS: 2 days | Discharge: HOME | End: 2021-08-23
Attending: OBSTETRICS & GYNECOLOGY | Admitting: OBSTETRICS & GYNECOLOGY
Payer: MEDICAID

## 2021-08-21 VITALS — DIASTOLIC BLOOD PRESSURE: 62 MMHG | SYSTOLIC BLOOD PRESSURE: 114 MMHG

## 2021-08-21 VITALS — DIASTOLIC BLOOD PRESSURE: 61 MMHG | SYSTOLIC BLOOD PRESSURE: 103 MMHG

## 2021-08-21 VITALS — DIASTOLIC BLOOD PRESSURE: 52 MMHG | SYSTOLIC BLOOD PRESSURE: 90 MMHG

## 2021-08-21 VITALS — SYSTOLIC BLOOD PRESSURE: 102 MMHG | DIASTOLIC BLOOD PRESSURE: 62 MMHG

## 2021-08-21 VITALS — HEIGHT: 58 IN | WEIGHT: 145.28 LBS | BODY MASS INDEX: 30.5 KG/M2

## 2021-08-21 VITALS — SYSTOLIC BLOOD PRESSURE: 90 MMHG | DIASTOLIC BLOOD PRESSURE: 50 MMHG

## 2021-08-21 VITALS — DIASTOLIC BLOOD PRESSURE: 81 MMHG | SYSTOLIC BLOOD PRESSURE: 122 MMHG

## 2021-08-21 VITALS — SYSTOLIC BLOOD PRESSURE: 107 MMHG | DIASTOLIC BLOOD PRESSURE: 64 MMHG

## 2021-08-21 DIAGNOSIS — Z20.822: ICD-10-CM

## 2021-08-21 DIAGNOSIS — Z30.2: ICD-10-CM

## 2021-08-21 DIAGNOSIS — O34.211: Primary | ICD-10-CM

## 2021-08-21 DIAGNOSIS — Z3A.40: ICD-10-CM

## 2021-08-21 LAB
BASOPHILS # BLD AUTO: 0 X10^3/UL (ref 0–0.1)
BASOPHILS # BLD AUTO: 0.1 X10^3/UL (ref 0–0.1)
BASOPHILS NFR BLD AUTO: 0 % (ref 0–1)
BASOPHILS NFR BLD AUTO: 1 % (ref 0–1)
EOSINOPHIL # BLD AUTO: 0 X10^3/UL (ref 0–0.4)
EOSINOPHIL # BLD AUTO: 0 X10^3/UL (ref 0–0.4)
EOSINOPHIL NFR BLD AUTO: 0 % (ref 1–7)
EOSINOPHIL NFR BLD AUTO: 0 % (ref 1–7)
ERYTHROCYTE [DISTWIDTH] IN BLOOD BY AUTOMATED COUNT: 13.7 % (ref 9.6–15.2)
ERYTHROCYTE [DISTWIDTH] IN BLOOD BY AUTOMATED COUNT: 13.8 % (ref 9.6–15.2)
LYMPHOCYTES # BLD AUTO: 0.6 X10^3/UL (ref 1–3.4)
LYMPHOCYTES # BLD AUTO: 0.8 X10^3/UL (ref 1–3.4)
LYMPHOCYTES NFR BLD AUTO: 5 % (ref 22–44)
LYMPHOCYTES NFR BLD AUTO: 8 % (ref 22–44)
MCH RBC QN AUTO: 28.3 PG (ref 27–34.8)
MCH RBC QN AUTO: 28.8 PG (ref 27–34.8)
MCHC RBC AUTO-ENTMCNC: 34.3 G/DL (ref 32.4–35.8)
MCHC RBC AUTO-ENTMCNC: 34.7 G/DL (ref 32.4–35.8)
MONOCYTES # BLD AUTO: 0.4 X10^3/UL (ref 0.2–0.8)
MONOCYTES # BLD AUTO: 0.7 X10^3/UL (ref 0.2–0.8)
MONOCYTES NFR BLD AUTO: 4 % (ref 2–9)
MONOCYTES NFR BLD AUTO: 6 % (ref 2–9)
NEUTROPHILS # BLD AUTO: 10.6 X10^3/UL (ref 1.8–6.8)
NEUTROPHILS # BLD AUTO: 9.6 X10^3/UL (ref 1.8–6.8)
NEUTROPHILS NFR BLD AUTO: 88 % (ref 42–75)
NEUTROPHILS NFR BLD AUTO: 89 % (ref 42–75)
PLATELET # BLD AUTO: 225 X10^3/UL (ref 130–400)
PLATELET # BLD AUTO: 241 X10^3/UL (ref 130–400)
PMV BLD AUTO: 7.7 FL (ref 7.4–10.4)
PMV BLD AUTO: 8 FL (ref 7.4–10.4)
RBC # BLD AUTO: 3.8 X10^6/UL (ref 3.82–5.3)
RBC # BLD AUTO: 4.54 X10^6/UL (ref 3.82–5.3)

## 2021-08-21 PROCEDURE — 86762 RUBELLA ANTIBODY: CPT

## 2021-08-21 PROCEDURE — 88302 TISSUE EXAM BY PATHOLOGIST: CPT

## 2021-08-21 PROCEDURE — 86592 SYPHILIS TEST NON-TREP QUAL: CPT

## 2021-08-21 PROCEDURE — 0UB70ZZ EXCISION OF BILATERAL FALLOPIAN TUBES, OPEN APPROACH: ICD-10-PCS | Performed by: SPECIALIST

## 2021-08-21 PROCEDURE — 87635 SARS-COV-2 COVID-19 AMP PRB: CPT

## 2021-08-21 PROCEDURE — 86900 BLOOD TYPING SEROLOGIC ABO: CPT

## 2021-08-21 PROCEDURE — 36415 COLL VENOUS BLD VENIPUNCTURE: CPT

## 2021-08-21 PROCEDURE — 85025 COMPLETE CBC W/AUTO DIFF WBC: CPT

## 2021-08-21 PROCEDURE — 86850 RBC ANTIBODY SCREEN: CPT

## 2021-08-21 RX ADMIN — KETOROLAC TROMETHAMINE SCH MG: 30 INJECTION, SOLUTION INTRAMUSCULAR at 16:48

## 2021-08-21 RX ADMIN — SODIUM CHLORIDE, SODIUM LACTATE, POTASSIUM CHLORIDE, AND CALCIUM CHLORIDE SCH MLS/HR: .6; .31; .03; .02 INJECTION, SOLUTION INTRAVENOUS at 06:30

## 2021-08-21 RX ADMIN — SODIUM CHLORIDE, SODIUM LACTATE, POTASSIUM CHLORIDE, AND CALCIUM CHLORIDE SCH MLS/HR: .6; .31; .03; .02 INJECTION, SOLUTION INTRAVENOUS at 22:36

## 2021-08-21 RX ADMIN — DOCUSATE SODIUM PRN MG: 100 CAPSULE, LIQUID FILLED ORAL at 19:17

## 2021-08-21 RX ADMIN — KETOROLAC TROMETHAMINE SCH MG: 30 INJECTION, SOLUTION INTRAMUSCULAR at 09:06

## 2021-08-21 RX ADMIN — Medication SCH MLS/HR: at 16:30

## 2021-08-21 RX ADMIN — SODIUM CHLORIDE, SODIUM LACTATE, POTASSIUM CHLORIDE, AND CALCIUM CHLORIDE SCH MLS/HR: .6; .31; .03; .02 INJECTION, SOLUTION INTRAVENOUS at 22:30

## 2021-08-21 RX ADMIN — SODIUM CHLORIDE, SODIUM LACTATE, POTASSIUM CHLORIDE, AND CALCIUM CHLORIDE SCH MLS/HR: .6; .31; .03; .02 INJECTION, SOLUTION INTRAVENOUS at 14:30

## 2021-08-21 RX ADMIN — Medication SCH MLS/HR: at 06:30

## 2021-08-21 RX ADMIN — DOCUSATE SODIUM PRN MG: 100 CAPSULE, LIQUID FILLED ORAL at 09:07

## 2021-08-21 RX ADMIN — Medication SCH MLS/HR: at 22:36

## 2021-08-21 RX ADMIN — SODIUM CHLORIDE, SODIUM LACTATE, POTASSIUM CHLORIDE, AND CALCIUM CHLORIDE SCH MLS/HR: .6; .31; .03; .02 INJECTION, SOLUTION INTRAVENOUS at 16:30

## 2021-08-21 RX ADMIN — KETOROLAC TROMETHAMINE SCH MG: 30 INJECTION, SOLUTION INTRAMUSCULAR at 22:51

## 2021-08-21 RX ADMIN — PRENATAL VIT W/ FE FUMARATE-FA TAB 27-0.8 MG SCH EACH: 27-0.8 TAB at 09:00

## 2021-08-22 VITALS — DIASTOLIC BLOOD PRESSURE: 60 MMHG | SYSTOLIC BLOOD PRESSURE: 96 MMHG

## 2021-08-22 VITALS — DIASTOLIC BLOOD PRESSURE: 53 MMHG | SYSTOLIC BLOOD PRESSURE: 88 MMHG

## 2021-08-22 VITALS — SYSTOLIC BLOOD PRESSURE: 113 MMHG | DIASTOLIC BLOOD PRESSURE: 69 MMHG

## 2021-08-22 RX ADMIN — DOCUSATE SODIUM PRN MG: 100 CAPSULE, LIQUID FILLED ORAL at 07:41

## 2021-08-22 RX ADMIN — Medication SCH MLS/HR: at 22:30

## 2021-08-22 RX ADMIN — KETOROLAC TROMETHAMINE SCH MG: 30 INJECTION, SOLUTION INTRAMUSCULAR at 23:33

## 2021-08-22 RX ADMIN — SODIUM CHLORIDE, SODIUM LACTATE, POTASSIUM CHLORIDE, AND CALCIUM CHLORIDE SCH MLS/HR: .6; .31; .03; .02 INJECTION, SOLUTION INTRAVENOUS at 22:30

## 2021-08-22 RX ADMIN — Medication SCH MLS/HR: at 12:30

## 2021-08-22 RX ADMIN — KETOROLAC TROMETHAMINE SCH MG: 30 INJECTION, SOLUTION INTRAMUSCULAR at 11:12

## 2021-08-22 RX ADMIN — SODIUM CHLORIDE, SODIUM LACTATE, POTASSIUM CHLORIDE, AND CALCIUM CHLORIDE SCH MLS/HR: .6; .31; .03; .02 INJECTION, SOLUTION INTRAVENOUS at 14:30

## 2021-08-22 RX ADMIN — PRENATAL VIT W/ FE FUMARATE-FA TAB 27-0.8 MG SCH EACH: 27-0.8 TAB at 07:42

## 2021-08-22 RX ADMIN — DOCUSATE SODIUM PRN MG: 100 CAPSULE, LIQUID FILLED ORAL at 23:34

## 2021-08-22 RX ADMIN — KETOROLAC TROMETHAMINE SCH MG: 30 INJECTION, SOLUTION INTRAMUSCULAR at 04:51

## 2021-08-22 RX ADMIN — SODIUM CHLORIDE, SODIUM LACTATE, POTASSIUM CHLORIDE, AND CALCIUM CHLORIDE SCH MLS/HR: .6; .31; .03; .02 INJECTION, SOLUTION INTRAVENOUS at 12:30

## 2021-08-22 RX ADMIN — KETOROLAC TROMETHAMINE SCH MG: 30 INJECTION, SOLUTION INTRAMUSCULAR at 17:31

## 2021-08-23 VITALS — SYSTOLIC BLOOD PRESSURE: 108 MMHG | DIASTOLIC BLOOD PRESSURE: 72 MMHG

## 2021-08-23 RX ADMIN — PRENATAL VIT W/ FE FUMARATE-FA TAB 27-0.8 MG SCH EACH: 27-0.8 TAB at 07:31

## 2021-08-23 RX ADMIN — DOCUSATE SODIUM PRN MG: 100 CAPSULE, LIQUID FILLED ORAL at 07:31

## 2021-08-23 RX ADMIN — SODIUM CHLORIDE, SODIUM LACTATE, POTASSIUM CHLORIDE, AND CALCIUM CHLORIDE SCH MLS/HR: .6; .31; .03; .02 INJECTION, SOLUTION INTRAVENOUS at 06:30

## 2022-10-24 ENCOUNTER — OFFICE VISIT (OUTPATIENT)
Dept: MEDICAL GROUP | Facility: CLINIC | Age: 32
End: 2022-10-24
Payer: MEDICAID

## 2022-10-24 DIAGNOSIS — F41.9 ANXIETY AND DEPRESSION: ICD-10-CM

## 2022-10-24 DIAGNOSIS — F32.A ANXIETY AND DEPRESSION: ICD-10-CM

## 2022-10-24 PROCEDURE — 90834 PSYTX W PT 45 MINUTES: CPT | Performed by: PSYCHOLOGIST

## 2022-10-24 NOTE — PROGRESS NOTES
[facility]  Psychotherapy Consult      Please see below for summary of today's session.     Patient Name: Ml Goldman  Patient MRN: [id number]  Today's Date:  10/24/22    Type of session: intake assessment  Session start time: 8  Session stop time: 8:45  Length of time spent face to face with patient: 45  Persons in attendance: patient    Diagnoses:   1. Anxiety and depression         Pt. States she is a year post partum and is struggling with depression/low motivation and intrusive thoughts.    Thoughts involve worry that something will happen to her baby.  Very anxious about this.  Zero urge to hurt her baby, totally ego dystonic.    Loves work but struggles with motivation, owns own hair business.  Not motivated to exercise or to eat better.    Happy in her relationship, has three children.    Close to her mother.  Not connected to other friendships.    No SI, No VI.  No history of self harm miguel harm to others.    No SENIA.      Has other OCD features, really likes things planned and organized, can pivot when necessary but prefers a schedule.    Took zoloft while pregnant, stopped after the birth.  Not interested in meds right now.    We focused on self compassion, and methods of behavioral activation.  Accepted referrals to psych services center, [facility], [facility].  And can f/u with this writer for support and consultation.  Marizol Schuster, Ph.D.

## 2024-08-27 ENCOUNTER — RESEARCH ENCOUNTER (OUTPATIENT)
Dept: RESEARCH | Facility: MEDICAL CENTER | Age: 34
End: 2024-08-27

## 2025-07-23 ENCOUNTER — OFFICE VISIT (OUTPATIENT)
Dept: URGENT CARE | Facility: CLINIC | Age: 35
End: 2025-07-23
Payer: COMMERCIAL

## 2025-07-23 ENCOUNTER — APPOINTMENT (OUTPATIENT)
Dept: RADIOLOGY | Facility: IMAGING CENTER | Age: 35
End: 2025-07-23
Attending: PHYSICIAN ASSISTANT
Payer: COMMERCIAL

## 2025-07-23 VITALS
HEART RATE: 104 BPM | HEIGHT: 65 IN | OXYGEN SATURATION: 99 % | DIASTOLIC BLOOD PRESSURE: 60 MMHG | SYSTOLIC BLOOD PRESSURE: 122 MMHG | WEIGHT: 158 LBS | BODY MASS INDEX: 26.33 KG/M2 | TEMPERATURE: 98.9 F | RESPIRATION RATE: 20 BRPM

## 2025-07-23 DIAGNOSIS — R07.81 RIB PAIN ON RIGHT SIDE: ICD-10-CM

## 2025-07-23 DIAGNOSIS — S22.31XA CLOSED FRACTURE OF ONE RIB OF RIGHT SIDE, INITIAL ENCOUNTER: ICD-10-CM

## 2025-07-23 DIAGNOSIS — R07.81 RIB PAIN ON RIGHT SIDE: Primary | ICD-10-CM

## 2025-07-23 PROCEDURE — 99214 OFFICE O/P EST MOD 30 MIN: CPT | Performed by: PHYSICIAN ASSISTANT

## 2025-07-23 PROCEDURE — 71101 X-RAY EXAM UNILAT RIBS/CHEST: CPT | Mod: TC,FY,RT | Performed by: RADIOLOGY

## 2025-07-23 PROCEDURE — 3074F SYST BP LT 130 MM HG: CPT | Performed by: PHYSICIAN ASSISTANT

## 2025-07-23 PROCEDURE — 3078F DIAST BP <80 MM HG: CPT | Performed by: PHYSICIAN ASSISTANT

## 2025-07-23 RX ORDER — DEXTROAMPHETAMINE SACCHARATE, AMPHETAMINE ASPARTATE MONOHYDRATE, DEXTROAMPHETAMINE SULFATE AND AMPHETAMINE SULFATE 7.5; 7.5; 7.5; 7.5 MG/1; MG/1; MG/1; MG/1
CAPSULE, EXTENDED RELEASE ORAL
COMMUNITY
Start: 2025-07-02

## 2025-07-24 NOTE — PROGRESS NOTES
"Subjective     Ml Goldman is a 35 y.o. female who presents with Rib Pain (Rt side of rib(underneath breast), back underneath Rt shoulder blade  hard time taking a deep breath x today, as she was wave boarding )    HPI:  Ml Goldman is a 35 y.o. female who presents today for evaluation of right-sided rib pain.  Patient reports that she took a pretty hard fall face forward into the water when she was wakeboarding earlier today.  She has some bruising on her hands and arms and had a little bit of soreness initially but as the day has gone she has had worsening pain in her right rib cage and under her right shoulder blade on her back.  She says that she is now also noting difficulty taking a deep breath without significant pain and feeling short of breath.  She has not taken any medication for symptoms.        ROS        PMH:  has a past medical history of Depression, Familial hyperlipidemia, and Insomnia.  MEDS: Current Medications[1]  ALLERGIES: Allergies[2]  SURGHX: Past Surgical History[3]  SOCHX:  reports that she has quit smoking. Her smoking use included cigarettes. She has never been exposed to tobacco smoke. She has never used smokeless tobacco. She reports current alcohol use. She reports that she does not use drugs.  FH: Family history was reviewed, no pertinent findings to report        Objective     /60 (BP Location: Left arm, Patient Position: Sitting, BP Cuff Size: Adult)   Pulse (!) 104   Temp 37.2 °C (98.9 °F) (Temporal)   Resp 20   Ht 1.651 m (5' 5\")   Wt 71.7 kg (158 lb)   SpO2 99%   Breastfeeding No   BMI 26.29 kg/m²      Physical Exam  Constitutional:       Appearance: She is well-developed.   HENT:      Head: Normocephalic and atraumatic.      Right Ear: External ear normal.      Left Ear: External ear normal.   Eyes:      Conjunctiva/sclera: Conjunctivae normal.      Pupils: Pupils are equal, round, and reactive to light.   Cardiovascular:      Rate and Rhythm: Regular rhythm. " Tachycardia present.      Heart sounds: Normal heart sounds. No murmur heard.  Pulmonary:      Effort: Pulmonary effort is normal.      Breath sounds: Normal breath sounds. No wheezing.   Chest:      Chest wall: Tenderness present. No swelling or crepitus.      Comments: Diffuse tenderness in right mid to lower rib cage extending towards the mid axillary line.  Patient has symmetrical rise and fall of the chest wall.  No obvious crepitus.  Negative hook test on the right side.  Musculoskeletal:      Cervical back: Normal range of motion.   Lymphadenopathy:      Cervical: No cervical adenopathy.   Skin:     General: Skin is warm and dry.      Capillary Refill: Capillary refill takes less than 2 seconds.   Neurological:      Mental Status: She is alert and oriented to person, place, and time.   Psychiatric:         Behavior: Behavior normal.         Judgment: Judgment normal.       [id number] (WITH 1-VIEW CXR) RIGHT  FINDINGS:  Cardiomediastinal silhouette is normal.     No focal consolidation, pleural effusion, pulmonary edema or pneumothorax.     Possible nondisplaced anterior right ninth rib fracture. No significant displaced rib fracture is seen.     IMPRESSION:     No acute cardiopulmonary abnormality.  Slight irregularity of the right anterior ninth rib could be a subtle nondisplaced fracture. No significant displaced rib fractures seen.    *X-rays were reviewed and interpreted independently by me. I agree with the radiologist's findings     Assessment & Plan     1. Rib pain on right side (Primary)  - [id number] (WITH 1-VIEW CXR) RIGHT; Future    2. Closed fracture of one rib of right side, initial encounter    X-ray was negative for any pneumothorax, pleural effusion, obvious signs of pulmonary edema.  She had a possible nondisplaced anterior ninth rib fracture but no other rib fractures noted on imaging.  Discussed results with patient.  Her pain is likely a combination of rib contusion and  probable ninth rib fracture but her pain seems a little bit out of proportion.  Discussed that there is still some concern for pneumothorax that may be delayed on imaging.  If her symptoms continue to worsen over the next few hours, especially with her discomfort with breathing, she was directed to go to the emergency department for higher level of care.  Offered Toradol and prescription for muscle relaxer from the urgent care setting.  Patient flatly declined.  States that she prefers to use OTC NSAIDs and acetaminophen.  She can also apply ice to the area and try topical analgesics such as lidocaine patches.  She was also directed to work on deep breathing exercises multiple times per day.          Differential Diagnosis, natural history, and supportive care discussed. Return to the Urgent Care or follow up with your PCP if symptoms fail to resolve, or for any new or worsening symptoms. Emergency room precautions discussed. Patient and/or family appears understanding of information.         [1]   Current Outpatient Medications:     amphetamine-dextroamphetamine ER (ADDERALL XR) 30 MG XR capsule, 1 CAPSULE BY MOUTH EVERY DAY TAKE ONCE DAILY FOR ADHD F90.2, Disp: , Rfl:   [2] No Known Allergies  [3]   Past Surgical History:  Procedure Laterality Date    GYN SURGERY       x 2    OTHER      deviated septum repair